# Patient Record
Sex: MALE | Race: WHITE | URBAN - METROPOLITAN AREA
[De-identification: names, ages, dates, MRNs, and addresses within clinical notes are randomized per-mention and may not be internally consistent; named-entity substitution may affect disease eponyms.]

---

## 2017-09-01 ENCOUNTER — HOSPITAL ENCOUNTER (OUTPATIENT)
Dept: CT IMAGING | Facility: CLINIC | Age: 72
Discharge: HOME | End: 2017-09-01
Admitting: RADIOLOGY
Payer: COMMERCIAL

## 2017-09-01 DIAGNOSIS — R25.2 TRISMUS: Primary | ICD-10-CM

## 2017-09-01 PROCEDURE — 70486 CT MAXILLOFACIAL W/O DYE: CPT

## 2019-06-18 ENCOUNTER — DOCUMENTATION ONLY (OUTPATIENT)
Dept: OPHTHALMOLOGY | Facility: CLINIC | Age: 74
End: 2019-06-18

## 2023-05-11 ENCOUNTER — NURSE TRIAGE (OUTPATIENT)
Dept: NURSING | Facility: CLINIC | Age: 78
End: 2023-05-11

## 2023-05-11 NOTE — TELEPHONE ENCOUNTER
Sister of patient, who is not a [facility] patient, was the caller.  She is a [facility] patisonny June lives in a group home and has covid. Caller is his sister..  Other residents have covid too.  Caller wanted to know if she would be safe from covid if she were to go visit him if she doubled masked.  We discussed scenarios of her going including visiting with him outside.  I told her there is never a guarantee. Wiping down surfaces, him wearing a mask are important along with good handwashng. Outside with both wearing a mask would be the least likely way of her bringing it home.  She stated understanding of all we discussed.    Deepika SEWELL RN [facility]